# Patient Record
Sex: FEMALE | Race: WHITE | NOT HISPANIC OR LATINO | URBAN - METROPOLITAN AREA
[De-identification: names, ages, dates, MRNs, and addresses within clinical notes are randomized per-mention and may not be internally consistent; named-entity substitution may affect disease eponyms.]

---

## 2024-06-17 PROCEDURE — 99284 EMERGENCY DEPT VISIT MOD MDM: CPT

## 2024-06-18 ENCOUNTER — HOSPITAL ENCOUNTER (EMERGENCY)
Facility: MEDICAL CENTER | Age: 44
End: 2024-06-18
Attending: STUDENT IN AN ORGANIZED HEALTH CARE EDUCATION/TRAINING PROGRAM
Payer: MEDICARE

## 2024-06-18 VITALS
DIASTOLIC BLOOD PRESSURE: 90 MMHG | HEART RATE: 90 BPM | WEIGHT: 154 LBS | TEMPERATURE: 97.7 F | SYSTOLIC BLOOD PRESSURE: 134 MMHG | OXYGEN SATURATION: 98 % | RESPIRATION RATE: 16 BRPM

## 2024-06-18 DIAGNOSIS — F41.8 SITUATIONAL ANXIETY: ICD-10-CM

## 2024-06-18 DIAGNOSIS — Z59.82: ICD-10-CM

## 2024-06-18 SDOH — ECONOMIC STABILITY - TRANSPORTATION SECURITY: TRANSPORTATION INSECURITY: Z59.82

## 2024-06-18 NOTE — ED NOTES
Brother Zia booked flight for patient to get back to [place], flight departing at [id number]. RN spoke w/  and charge RN. Pt will stay in ED and will be assisted w/ transportation to to airport later in the AM
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
## 2024-06-18 NOTE — ED NOTES
Cab called. Given discharge instructions given. Pt verbalizes understanding. Alert oriented and ambulatory.
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
